# Patient Record
Sex: MALE | Race: WHITE | NOT HISPANIC OR LATINO | Employment: OTHER | URBAN - METROPOLITAN AREA
[De-identification: names, ages, dates, MRNs, and addresses within clinical notes are randomized per-mention and may not be internally consistent; named-entity substitution may affect disease eponyms.]

---

## 2017-02-14 ENCOUNTER — TELEPHONE (OUTPATIENT)
Dept: ORTHOPEDIC SURGERY | Facility: CLINIC | Age: 37
End: 2017-02-14

## 2017-02-14 NOTE — TELEPHONE ENCOUNTER
Not sure if you want to refill this as it looks like it was while patient was doing therapy. Please advise thank you-lck

## 2017-03-20 ENCOUNTER — TELEPHONE (OUTPATIENT)
Dept: ORTHOPEDIC SURGERY | Facility: CLINIC | Age: 37
End: 2017-03-20

## 2018-01-19 ENCOUNTER — TELEPHONE (OUTPATIENT)
Dept: ORTHOPEDIC SURGERY | Facility: CLINIC | Age: 38
End: 2018-01-19

## 2018-07-27 ENCOUNTER — APPOINTMENT (OUTPATIENT)
Dept: CT IMAGING | Facility: HOSPITAL | Age: 38
End: 2018-07-27

## 2018-07-27 ENCOUNTER — APPOINTMENT (OUTPATIENT)
Dept: GENERAL RADIOLOGY | Facility: HOSPITAL | Age: 38
End: 2018-07-27

## 2018-07-27 ENCOUNTER — HOSPITAL ENCOUNTER (EMERGENCY)
Facility: HOSPITAL | Age: 38
Discharge: HOME | End: 2018-07-27
Attending: EMERGENCY MEDICINE | Admitting: EMERGENCY MEDICINE

## 2018-07-27 VITALS
HEIGHT: 70 IN | TEMPERATURE: 98.7 F | HEART RATE: 88 BPM | OXYGEN SATURATION: 96 % | RESPIRATION RATE: 16 BRPM | DIASTOLIC BLOOD PRESSURE: 88 MMHG | SYSTOLIC BLOOD PRESSURE: 146 MMHG

## 2018-07-27 DIAGNOSIS — S09.93XA DENTAL INJURY, INITIAL ENCOUNTER: Primary | ICD-10-CM
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
## 2018-07-27 DIAGNOSIS — S01.511A LACERATION OF LOWER LIP, INITIAL ENCOUNTER: ICD-10-CM
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
## 2018-07-27 DIAGNOSIS — M25.512 LEFT SHOULDER PAIN, UNSPECIFIED CHRONICITY: ICD-10-CM

## 2018-07-27 PROCEDURE — 73030 X-RAY EXAM OF SHOULDER: CPT

## 2018-07-27 PROCEDURE — 70450 CT HEAD/BRAIN W/O DYE: CPT

## 2018-07-27 PROCEDURE — 25010000002 TDAP 5-2.5-18.5 LF-MCG/0.5 SUSPENSION: Performed by: PHYSICIAN ASSISTANT

## 2018-07-27 PROCEDURE — 99284 EMERGENCY DEPT VISIT MOD MDM: CPT

## 2018-07-27 PROCEDURE — 90471 IMMUNIZATION ADMIN: CPT | Performed by: PHYSICIAN ASSISTANT

## 2018-07-27 PROCEDURE — 70486 CT MAXILLOFACIAL W/O DYE: CPT

## 2018-07-27 PROCEDURE — 90715 TDAP VACCINE 7 YRS/> IM: CPT | Performed by: PHYSICIAN ASSISTANT

## 2018-07-27 RX ORDER — HYDROCODONE BITARTRATE AND ACETAMINOPHEN 5; 325 MG/1; MG/1
1 TABLET ORAL EVERY 12 HOURS
COMMUNITY

## 2018-07-27 RX ORDER — AMOXICILLIN 500 MG/1
1000 CAPSULE ORAL 3 TIMES DAILY
Qty: 21 CAPSULE | Refills: 0 | Status: SHIPPED | OUTPATIENT
Start: 2018-07-27 | End: 2019-09-02 | Stop reason: HOSPADM

## 2018-07-27 RX ORDER — DICLOFENAC SODIUM 75 MG/1
75 TABLET, DELAYED RELEASE ORAL 2 TIMES DAILY
Qty: 20 TABLET | Refills: 0 | Status: SHIPPED | OUTPATIENT
Start: 2018-07-27 | End: 2019-09-02 | Stop reason: HOSPADM

## 2018-07-27 RX ORDER — LIDOCAINE HYDROCHLORIDE AND EPINEPHRINE 10; 10 MG/ML; UG/ML
20 INJECTION, SOLUTION INFILTRATION; PERINEURAL ONCE
Status: COMPLETED | OUTPATIENT
Start: 2018-07-27 | End: 2018-07-27

## 2018-07-27 RX ORDER — OXYCODONE HYDROCHLORIDE AND ACETAMINOPHEN 5; 325 MG/1; MG/1
1 TABLET ORAL ONCE
Status: COMPLETED | OUTPATIENT
Start: 2018-07-27 | End: 2018-07-27

## 2018-07-27 RX ADMIN — LIDOCAINE HYDROCHLORIDE,EPINEPHRINE BITARTRATE 1 ML: 10; .01 INJECTION, SOLUTION INFILTRATION; PERINEURAL at 23:04

## 2018-07-27 RX ADMIN — OXYCODONE HYDROCHLORIDE AND ACETAMINOPHEN 1 TABLET: 5; 325 TABLET ORAL at 23:32

## 2018-07-27 RX ADMIN — TETANUS TOXOID, REDUCED DIPHTHERIA TOXOID AND ACELLULAR PERTUSSIS VACCINE, ADSORBED 0.5 ML: 5; 2.5; 8; 8; 2.5 SUSPENSION INTRAMUSCULAR at 21:55

## 2019-08-31 ENCOUNTER — APPOINTMENT (OUTPATIENT)
Dept: CT IMAGING | Facility: HOSPITAL | Age: 39
End: 2019-08-31

## 2019-08-31 ENCOUNTER — HOSPITAL ENCOUNTER (OUTPATIENT)
Facility: HOSPITAL | Age: 39
Setting detail: OBSERVATION
Discharge: HOME | End: 2019-09-02
Attending: EMERGENCY MEDICINE | Admitting: INTERNAL MEDICINE

## 2019-08-31 DIAGNOSIS — R56.9 NEW ONSET SEIZURE (HCC): Primary | ICD-10-CM

## 2019-08-31 PROCEDURE — 99285 EMERGENCY DEPT VISIT HI MDM: CPT

## 2019-08-31 PROCEDURE — 96374 THER/PROPH/DIAG INJ IV PUSH: CPT

## 2019-08-31 PROCEDURE — 70450 CT HEAD/BRAIN W/O DYE: CPT

## 2019-08-31 PROCEDURE — G0378 HOSPITAL OBSERVATION PER HR: HCPCS

## 2019-08-31 PROCEDURE — 36415 COLL VENOUS BLD VENIPUNCTURE: CPT

## 2019-08-31 RX ORDER — SODIUM CHLORIDE 0.9 % (FLUSH) 0.9 %
10 SYRINGE (ML) INJECTION AS NEEDED
Status: DISCONTINUED | OUTPATIENT
Start: 2019-08-31 | End: 2019-09-02 | Stop reason: HOSPADM

## 2019-09-01 PROBLEM — F98.8 ADD (ATTENTION DEFICIT DISORDER): Status: ACTIVE | Noted: 2019-09-01

## 2019-09-01 PROBLEM — R56.9 NEW ONSET SEIZURE (HCC): Status: ACTIVE | Noted: 2019-09-01

## 2019-09-01 PROBLEM — F12.10 MILD TETRAHYDROCANNABINOL (THC) ABUSE: Status: ACTIVE | Noted: 2019-09-01

## 2019-09-01 LAB
ALBUMIN SERPL-MCNC: 4.3 G/DL (ref 3.5–5.2)
ALBUMIN/GLOB SERPL: 1.7 G/DL
ALP SERPL-CCNC: 57 U/L (ref 39–117)
ALT SERPL W P-5'-P-CCNC: 37 U/L (ref 1–41)
AMPHET+METHAMPHET UR QL: NEGATIVE
ANION GAP SERPL CALCULATED.3IONS-SCNC: 10.2 MMOL/L (ref 5–15)
ANION GAP SERPL CALCULATED.3IONS-SCNC: 8.1 MMOL/L (ref 5–15)
AST SERPL-CCNC: 28 U/L (ref 1–40)
BARBITURATES UR QL SCN: NEGATIVE
BASOPHILS # BLD AUTO: 0.06 10*3/MM3 (ref 0–0.2)
BASOPHILS NFR BLD AUTO: 0.6 % (ref 0–1.5)
BENZODIAZ UR QL SCN: NEGATIVE
BILIRUB SERPL-MCNC: 0.2 MG/DL (ref 0.2–1.2)
BILIRUB UR QL STRIP: NEGATIVE
BUN BLD-MCNC: 11 MG/DL (ref 6–20)
BUN BLD-MCNC: 9 MG/DL (ref 6–20)
BUN/CREAT SERPL: 11 (ref 7–25)
BUN/CREAT SERPL: 11.3 (ref 7–25)
CALCIUM SPEC-SCNC: 8.4 MG/DL (ref 8.6–10.5)
CALCIUM SPEC-SCNC: 8.7 MG/DL (ref 8.6–10.5)
CANNABINOIDS SERPL QL: POSITIVE
CHLORIDE SERPL-SCNC: 100 MMOL/L (ref 98–107)
CHLORIDE SERPL-SCNC: 99 MMOL/L (ref 98–107)
CK SERPL-CCNC: 137 U/L (ref 20–200)
CLARITY UR: ABNORMAL
CO2 SERPL-SCNC: 24.8 MMOL/L (ref 22–29)
CO2 SERPL-SCNC: 24.9 MMOL/L (ref 22–29)
COCAINE UR QL: NEGATIVE
COLOR UR: YELLOW
CREAT BLD-MCNC: 0.82 MG/DL (ref 0.76–1.27)
CREAT BLD-MCNC: 0.97 MG/DL (ref 0.76–1.27)
DEPRECATED RDW RBC AUTO: 43.8 FL (ref 37–54)
DEPRECATED RDW RBC AUTO: 45.2 FL (ref 37–54)
EOSINOPHIL # BLD AUTO: 0.16 10*3/MM3 (ref 0–0.4)
EOSINOPHIL NFR BLD AUTO: 1.5 % (ref 0.3–6.2)
ERYTHROCYTE [DISTWIDTH] IN BLOOD BY AUTOMATED COUNT: 12.8 % (ref 12.3–15.4)
ERYTHROCYTE [DISTWIDTH] IN BLOOD BY AUTOMATED COUNT: 13.1 % (ref 12.3–15.4)
ETHANOL BLD-MCNC: <10 MG/DL (ref 0–10)
ETHANOL UR QL: <0.01 %
GFR SERPL CREATININE-BSD FRML MDRD: 105 ML/MIN/1.73
GFR SERPL CREATININE-BSD FRML MDRD: 86 ML/MIN/1.73
GLOBULIN UR ELPH-MCNC: 2.6 GM/DL
GLUCOSE BLD-MCNC: 150 MG/DL (ref 65–99)
GLUCOSE BLD-MCNC: 178 MG/DL (ref 65–99)
GLUCOSE BLDC GLUCOMTR-MCNC: 152 MG/DL (ref 70–130)
GLUCOSE BLDC GLUCOMTR-MCNC: 167 MG/DL (ref 70–130)
GLUCOSE UR STRIP-MCNC: ABNORMAL MG/DL
HCT VFR BLD AUTO: 40.3 % (ref 37.5–51)
HCT VFR BLD AUTO: 45.2 % (ref 37.5–51)
HGB BLD-MCNC: 12.8 G/DL (ref 13–17.7)
HGB BLD-MCNC: 14.4 G/DL (ref 13–17.7)
HGB UR QL STRIP.AUTO: NEGATIVE
IMM GRANULOCYTES # BLD AUTO: 0.17 10*3/MM3 (ref 0–0.05)
IMM GRANULOCYTES NFR BLD AUTO: 1.6 % (ref 0–0.5)
KETONES UR QL STRIP: ABNORMAL
LEUKOCYTE ESTERASE UR QL STRIP.AUTO: NEGATIVE
LYMPHOCYTES # BLD AUTO: 2.37 10*3/MM3 (ref 0.7–3.1)
LYMPHOCYTES NFR BLD AUTO: 21.9 % (ref 19.6–45.3)
MAGNESIUM SERPL-MCNC: 2.1 MG/DL (ref 1.6–2.6)
MCH RBC QN AUTO: 29.6 PG (ref 26.6–33)
MCH RBC QN AUTO: 30.2 PG (ref 26.6–33)
MCHC RBC AUTO-ENTMCNC: 31.8 G/DL (ref 31.5–35.7)
MCHC RBC AUTO-ENTMCNC: 31.9 G/DL (ref 31.5–35.7)
MCV RBC AUTO: 93 FL (ref 79–97)
MCV RBC AUTO: 95 FL (ref 79–97)
METHADONE UR QL SCN: POSITIVE
MONOCYTES # BLD AUTO: 0.74 10*3/MM3 (ref 0.1–0.9)
MONOCYTES NFR BLD AUTO: 6.8 % (ref 5–12)
NEUTROPHILS # BLD AUTO: 7.32 10*3/MM3 (ref 1.7–7)
NEUTROPHILS NFR BLD AUTO: 67.6 % (ref 42.7–76)
NITRITE UR QL STRIP: NEGATIVE
NRBC BLD AUTO-RTO: 0.1 /100 WBC (ref 0–0.2)
OPIATES UR QL: NEGATIVE
OXYCODONE UR QL SCN: NEGATIVE
PH UR STRIP.AUTO: <=5 [PH] (ref 5–8)
PLATELET # BLD AUTO: 224 10*3/MM3 (ref 140–450)
PLATELET # BLD AUTO: 245 10*3/MM3 (ref 140–450)
PMV BLD AUTO: 11.1 FL (ref 6–12)
PMV BLD AUTO: 11.5 FL (ref 6–12)
POTASSIUM BLD-SCNC: 3.5 MMOL/L (ref 3.5–5.2)
POTASSIUM BLD-SCNC: 4.1 MMOL/L (ref 3.5–5.2)
PROT SERPL-MCNC: 6.9 G/DL (ref 6–8.5)
PROT UR QL STRIP: NEGATIVE
RBC # BLD AUTO: 4.24 10*6/MM3 (ref 4.14–5.8)
RBC # BLD AUTO: 4.86 10*6/MM3 (ref 4.14–5.8)
SODIUM BLD-SCNC: 133 MMOL/L (ref 136–145)
SODIUM BLD-SCNC: 134 MMOL/L (ref 136–145)
SP GR UR STRIP: 1.02 (ref 1–1.03)
UROBILINOGEN UR QL STRIP: ABNORMAL
WBC NRBC COR # BLD: 10.59 10*3/MM3 (ref 3.4–10.8)
WBC NRBC COR # BLD: 10.82 10*3/MM3 (ref 3.4–10.8)

## 2019-09-01 PROCEDURE — 25010000002 LORAZEPAM PER 2 MG: Performed by: EMERGENCY MEDICINE

## 2019-09-01 PROCEDURE — 80053 COMPREHEN METABOLIC PANEL: CPT | Performed by: PHYSICIAN ASSISTANT

## 2019-09-01 PROCEDURE — 80307 DRUG TEST PRSMV CHEM ANLYZR: CPT | Performed by: PHYSICIAN ASSISTANT

## 2019-09-01 PROCEDURE — 99244 OFF/OP CNSLTJ NEW/EST MOD 40: CPT | Performed by: PSYCHIATRY & NEUROLOGY

## 2019-09-01 PROCEDURE — 83735 ASSAY OF MAGNESIUM: CPT | Performed by: PHYSICIAN ASSISTANT

## 2019-09-01 PROCEDURE — 82550 ASSAY OF CK (CPK): CPT | Performed by: NURSE PRACTITIONER

## 2019-09-01 PROCEDURE — 85025 COMPLETE CBC W/AUTO DIFF WBC: CPT | Performed by: PHYSICIAN ASSISTANT

## 2019-09-01 PROCEDURE — 96374 THER/PROPH/DIAG INJ IV PUSH: CPT

## 2019-09-01 PROCEDURE — 85027 COMPLETE CBC AUTOMATED: CPT | Performed by: NURSE PRACTITIONER

## 2019-09-01 PROCEDURE — G0378 HOSPITAL OBSERVATION PER HR: HCPCS

## 2019-09-01 PROCEDURE — 81003 URINALYSIS AUTO W/O SCOPE: CPT | Performed by: PHYSICIAN ASSISTANT

## 2019-09-01 PROCEDURE — 82962 GLUCOSE BLOOD TEST: CPT

## 2019-09-01 PROCEDURE — 96361 HYDRATE IV INFUSION ADD-ON: CPT

## 2019-09-01 RX ORDER — ONDANSETRON 2 MG/ML
4 INJECTION INTRAMUSCULAR; INTRAVENOUS EVERY 6 HOURS PRN
Status: DISCONTINUED | OUTPATIENT
Start: 2019-09-01 | End: 2019-09-02 | Stop reason: HOSPADM

## 2019-09-01 RX ORDER — TRAMADOL HYDROCHLORIDE 50 MG/1
50 TABLET ORAL EVERY 6 HOURS PRN
Status: DISCONTINUED | OUTPATIENT
Start: 2019-09-01 | End: 2019-09-02 | Stop reason: HOSPADM

## 2019-09-01 RX ORDER — SODIUM CHLORIDE 0.9 % (FLUSH) 0.9 %
10 SYRINGE (ML) INJECTION AS NEEDED
Status: DISCONTINUED | OUTPATIENT
Start: 2019-09-01 | End: 2019-09-02 | Stop reason: HOSPADM

## 2019-09-01 RX ORDER — TRAMADOL HYDROCHLORIDE 50 MG/1
50 TABLET ORAL EVERY 6 HOURS PRN
COMMUNITY
End: 2019-09-02 | Stop reason: HOSPADM

## 2019-09-01 RX ORDER — ACETAMINOPHEN 650 MG/1
650 SUPPOSITORY RECTAL EVERY 4 HOURS PRN
Status: DISCONTINUED | OUTPATIENT
Start: 2019-09-01 | End: 2019-09-02 | Stop reason: HOSPADM

## 2019-09-01 RX ORDER — DEXTROAMPHETAMINE SULFATE 30 MG/1
TABLET ORAL
COMMUNITY

## 2019-09-01 RX ORDER — SODIUM CHLORIDE 9 MG/ML
75 INJECTION, SOLUTION INTRAVENOUS CONTINUOUS
Status: DISCONTINUED | OUTPATIENT
Start: 2019-09-01 | End: 2019-09-02 | Stop reason: HOSPADM

## 2019-09-01 RX ORDER — ACETAMINOPHEN 325 MG/1
650 TABLET ORAL EVERY 4 HOURS PRN
Status: DISCONTINUED | OUTPATIENT
Start: 2019-09-01 | End: 2019-09-02 | Stop reason: HOSPADM

## 2019-09-01 RX ORDER — LORAZEPAM 2 MG/ML
1 INJECTION INTRAMUSCULAR ONCE
Status: COMPLETED | OUTPATIENT
Start: 2019-09-01 | End: 2019-09-01

## 2019-09-01 RX ORDER — SODIUM CHLORIDE 0.9 % (FLUSH) 0.9 %
10 SYRINGE (ML) INJECTION EVERY 12 HOURS SCHEDULED
Status: DISCONTINUED | OUTPATIENT
Start: 2019-09-01 | End: 2019-09-02 | Stop reason: HOSPADM

## 2019-09-01 RX ORDER — ACETAMINOPHEN 160 MG/5ML
650 SOLUTION ORAL EVERY 4 HOURS PRN
Status: DISCONTINUED | OUTPATIENT
Start: 2019-09-01 | End: 2019-09-02 | Stop reason: HOSPADM

## 2019-09-01 RX ADMIN — SODIUM CHLORIDE, PRESERVATIVE FREE 10 ML: 5 INJECTION INTRAVENOUS at 10:49

## 2019-09-01 RX ADMIN — SODIUM CHLORIDE 75 ML/HR: 9 INJECTION, SOLUTION INTRAVENOUS at 06:55

## 2019-09-01 RX ADMIN — ACETAMINOPHEN 650 MG: 325 TABLET, FILM COATED ORAL at 14:23

## 2019-09-01 RX ADMIN — SODIUM CHLORIDE, PRESERVATIVE FREE 10 ML: 5 INJECTION INTRAVENOUS at 21:06

## 2019-09-01 RX ADMIN — LORAZEPAM 1 MG: 2 INJECTION INTRAMUSCULAR; INTRAVENOUS at 00:28

## 2019-09-01 RX ADMIN — SODIUM CHLORIDE 1000 ML: 9 INJECTION, SOLUTION INTRAVENOUS at 04:28

## 2019-09-01 NOTE — ED NOTES
Pt unable to provide urine sample at this time. Urinal left at bedside.     Romero Watkins  09/01/19 0033

## 2019-09-01 NOTE — H&P
[facility] Admission H&P    Patient Care Team:  Provider, No Known as PCP - General    Chief complaint: Seizure-like activity at home    History of Present Illness    This is a 38-year-old male who presented to the emergency room after he was observed to have seizure-like activity at home.  He was sitting on the couch with his significant other playing a card game at approximately 11:00 last night when he seemingly lost consciousness, had rhythmic jerking motions, and was otherwise unresponsive.  He does not room for any of the events.  When he woke up from the event he was confused and felt that his mentation was cloudy.  He denies any bladder or bowel incontinence.  He did not think that he had any tongue biting but evaluation in the emergency room did reveal bruising to the right side of his tongue and he does have some soreness.  He has never had a seizure before nor does have any family history of neurologic disorders.  He takes tramadol on a fairly regular basis for mouth and tooth pain status post multiple extractions.  He did take 100 mg of this at approximately 930 last night.  He does also admit to THC use but states it is been approximately 3 days since he last smoked marijuana.  He is also prescribed amphetamines for ADD along with gabapentin and hydrocodone but states he is not taking any of these medications in at least 3 weeks.  He denies any other illicit drug use.  When I asked him about the positive methadone screen on his UDS he does not know where this came from.  The only over-the-counter medication he has taken recently was Robitussin for a cough.  He has not had any recurrence of seizure-like activity since his presentation and subsequent admission.  He feels back to baseline currently.    Past Medical History:   Diagnosis Date   • Acute meniscal tear of left knee    • ADD (attention deficit disorder)    • Anxiety as acute reaction to exceptional stress    • Depression    • Difficulty sleeping    •  Joint pain     or swelling   • Stiffness in joint    • Unexplained weight gain     or loss     Past Surgical History:   Procedure Laterality Date   • KNEE ARTHROSCOPY Left 2016    Procedure: LEFT KNEE ARTHROSCOPY WITH PARTIAL LATERAL MENISECTOMY AND DEBRIDEMENT;  Surgeon: Bertin Fair MD;  [facility] [place] [facility]  Service:    • OTHER SURGICAL HISTORY      knee x-ray   • SHOULDER ARTHROSCOPY W/ LABRAL REPAIR Left 2017   • WISDOM TOOTH EXTRACTION  ,      History reviewed. No pertinent family history.  Social History     Tobacco Use   • Smoking status: Former Smoker     Packs/day: 1.00     Years: 14.00     Pack years: 14.00     Last attempt to quit: 2014     Years since quittin.3   Substance Use Topics   • Alcohol use: No   • Drug use: Yes     Types: Marijuana     Comment: used yesterday     Medications Prior to Admission   Medication Sig Dispense Refill Last Dose   • acetaminophen (TYLENOL) 500 MG tablet Take 500 mg by mouth Every 6 (Six) Hours As Needed for mild pain (1-3).   Taking   • Dextroamphetamine Sulfate (ZENZEDI) 30 MG tablet Zenzedi 30 mg tablet      • HYDROcodone-acetaminophen (NORCO) 5-325 MG per tablet Take 1 tablet by mouth Every 12 (Twelve) Hours.      • traMADol (ULTRAM) 50 MG tablet Take 50 mg by mouth Every 6 (Six) Hours As Needed for Moderate Pain .      • amoxicillin (AMOXIL) 500 MG capsule Take 2 capsules by mouth 3 (Three) Times a Day. 21 capsule 0    • diclofenac (VOLTAREN) 75 MG EC tablet Take 1 tablet by mouth 2 (Two) Times a Day. 20 tablet 0      Allergies:  Patient has no known allergies.    Review of Systems   Constitutional: Negative for chills and fever.   HENT: Negative for congestion and sore throat.    Eyes: Negative for visual disturbance.   Respiratory: Negative for cough, chest tightness, shortness of breath and wheezing.    Cardiovascular: Negative for chest pain, palpitations and leg swelling.   Gastrointestinal: Negative for abdominal distention,  abdominal pain, diarrhea, nausea and vomiting.   Endocrine: Negative for polydipsia and polyuria.   Genitourinary: Negative for difficulty urinating, dysuria, frequency and urgency.   Musculoskeletal: Negative for arthralgias and myalgias.   Skin: Negative for color change and rash.   Neurological: Positive for seizures. Negative for dizziness and light-headedness.        PHYSICAL EXAM    Vital Signs  tMax 24 hrs:  Temp (24hrs), Av.6 °F (36.4 °C), Min:97.2 °F (36.2 °C), Max:97.9 °F (36.6 °C)    Vitals Ranges:  Temp:  [97.2 °F (36.2 °C)-97.9 °F (36.6 °C)] 97.9 °F (36.6 °C)  Heart Rate:  [] 81  Resp:  [16-18] 18  BP: (105-162)/(66-93) 129/85    Physical Exam   Constitutional: He is oriented to person, place, and time. He appears well-developed and well-nourished.   HENT:   Head: Normocephalic and atraumatic.   Small area of bruising to the right lateral tongue   Eyes: EOM are normal. Pupils are equal, round, and reactive to light.   Neck: Neck supple. No tracheal deviation present.   Cardiovascular: Normal rate and regular rhythm. Exam reveals no gallop.   No murmur heard.  Pulmonary/Chest: Effort normal. No respiratory distress. He has no wheezes.   Abdominal: Soft. Bowel sounds are normal. He exhibits no distension. There is no tenderness.   Musculoskeletal: He exhibits no edema or tenderness.   Neurological: He is alert and oriented to person, place, and time. No cranial nerve deficit.   Skin: Skin is warm and dry.   Nursing note and vitals reviewed.      Results Review:  Results from last 7 days   Lab Units 19  0624   WBC 10*3/mm3 10.59   HEMOGLOBIN g/dL 12.8*   HEMATOCRIT % 40.3   PLATELETS 10*3/mm3 224     Results from last 7 days   Lab Units 19  0624 19  0023   SODIUM mmol/L 133* 134*   POTASSIUM mmol/L 4.1 3.5   CHLORIDE mmol/L 100 99   CO2 mmol/L 24.9 24.8   BUN mg/dL 9 11   CREATININE mg/dL 0.82 0.97   CALCIUM mg/dL 8.4* 8.7   BILIRUBIN mg/dL  --  0.2   ALK PHOS U/L  --  57   ALT  (SGPT) U/L  --  37   AST (SGOT) U/L  --  28   GLUCOSE mg/dL 150* 178*     Head CT showed no acute intracranial abnormality     I reviewed the patient's new clinical results.  I reviewed the patient's new imaging results and agree with the interpretation.        Active Hospital Problems    Diagnosis  POA   • **New onset seizure (CMS/HCC) [R56.9]  Yes   • Mild tetrahydrocannabinol (THC) abuse [F12.10]  Unknown   • ADD (attention deficit disorder) [F98.8]  Unknown      Resolved Hospital Problems   No resolved problems to display.       Assessment & Plan    The patient was admitted overnight last night for what sounds to be a new onset seizure.  He has no prior history of this.  He did take tramadol yesterday about an hour and a half before the episode and does smoke marijuana with some regularity.  He denies any other prescription or illicit drug use.  He has had no recurrence of seizures and head CT was negative.  Neurology has been consulted.  At this point we will hold off on scheduled antiepileptic medications.  We will see if they want to perform a brain MRI and EEG while he is here.  I have stopped all potentially contributing medications.  I do not get the sense that he is withdrawing from anything.  Aside from feeling a little fatigued he is now back at baseline.  As based on his clinical course and input from neurology.    I discussed the patients findings and my recommendations with patient and family    Rosendo Kumar MD  09/01/19  11:51 AM

## 2019-09-01 NOTE — ED NOTES
Pt still unable to provide urine sample. Pt given more water to drink. Pt instructed to call when he is able to give a sample.     Mendy De Gzuman RN  09/01/19 0329

## 2019-09-01 NOTE — ED NOTES
EMS picked pt up from home. Pt had a witnessed seizure while sitting on the cough with his girlfriend. Upon arrival EMS reports that pt was post ictal.  Pt is now alert and oriented. EMS reports upon pulling into ER pt became diaphoretic and heart rate elevated. Pt reports get sweats like this when he is nervous. Initially pt denies any complaints. Pt reports that he is not reporting a headache. EMS reports noting some abd distention and pt is unaware if that is normal. EMS reports that pt has HTN but is not compliant with medication.     Ursula Baker RN  08/31/19 [id number]

## 2019-09-01 NOTE — CONSULTS
Patient Identification:  NAME:  Juve Crane  Age:  38 y.o.   Sex:  male   :  1980   MRN:  [id number]       Chief complaint: He does not have one/ reason for consult seizure  History of present illness: This patient is 38-year-old right-handed white male with a history of ADD anxiety depression history of drug abuse who comes to the hospital after he was visiting with friends or sitting on the couch with his girlfriend he had taken 100 mg of Ultram as context before that.  Quality was a loss of consciousness and tonic-clonic seizure he did bite his tongue is not associated symptom modifying factors none quality as described duration is noted context of patient who smokes marijuana and tested positive for methadone although he does not know how he tested positive for that CT of the head by my independent eyeball review is normal.  By report he has hypertension but is not compliant with medication either.      Past medical history:  Past Medical History:   Diagnosis Date   • Acute meniscal tear of left knee    • ADD (attention deficit disorder)    • Anxiety as acute reaction to exceptional stress    • Depression    • Difficulty sleeping    • Joint pain     or swelling   • Stiffness in joint    • Unexplained weight gain     or loss       Past surgical history:  Past Surgical History:   Procedure Laterality Date   • KNEE ARTHROSCOPY Left 2016    Procedure: LEFT KNEE ARTHROSCOPY WITH PARTIAL LATERAL MENISECTOMY AND DEBRIDEMENT;  Surgeon: Bertin Fair MD;  [facility] [place] [facility];  Service:    • OTHER SURGICAL HISTORY      knee x-ray   • SHOULDER ARTHROSCOPY W/ LABRAL REPAIR Left 2017   • WISDOM TOOTH EXTRACTION  2005       Allergies:  Patient has no known allergies.    Home medications:  Medications Prior to Admission   Medication Sig Dispense Refill Last Dose   • acetaminophen (TYLENOL) 500 MG tablet Take 500 mg by mouth Every 6 (Six) Hours As Needed for mild pain (1-3).   Taking   •  Dextroamphetamine Sulfate (ZENZEDI) 30 MG tablet Zenzedi 30 mg tablet      • HYDROcodone-acetaminophen (NORCO) 5-325 MG per tablet Take 1 tablet by mouth Every 12 (Twelve) Hours.      • traMADol (ULTRAM) 50 MG tablet Take 50 mg by mouth Every 6 (Six) Hours As Needed for Moderate Pain .      • amoxicillin (AMOXIL) 500 MG capsule Take 2 capsules by mouth 3 (Three) Times a Day. 21 capsule 0    • diclofenac (VOLTAREN) 75 MG EC tablet Take 1 tablet by mouth 2 (Two) Times a Day. 20 tablet 0         Hospital medications:    sodium chloride 10 mL Intravenous Q12H       sodium chloride 75 mL/hr Last Rate: 75 mL/hr (19 0943)     •  acetaminophen **OR** acetaminophen **OR** acetaminophen  •  ondansetron  •  [COMPLETED] Insert peripheral IV **AND** sodium chloride  •  sodium chloride    Family history:  History reviewed. No pertinent family history.    Social history:  Social History     Tobacco Use   • Smoking status: Former Smoker     Packs/day: 1.00     Years: 14.00     Pack years: 14.00     Last attempt to quit: 2014     Years since quittin.3   Substance Use Topics   • Alcohol use: No   • Drug use: Yes     Types: Marijuana     Comment: used yesterday       Review of systems:    He feels tired he did bite his tongue he is complained of being nervous but no hair eyes ears nose throat skin bone joint  lymphatic hematologic or oncologic complaints no neck pain chest pain abdominal pain bowel bladder incontinence fever chills or rash he does not know how he tested positive for methadone    Objective:  Vitals Ranges:   Temp:  [97.2 °F (36.2 °C)-97.9 °F (36.6 °C)] 97.9 °F (36.6 °C)  Heart Rate:  [] 81  Resp:  [16-18] 18  BP: (105-162)/(66-93) 124/79      Physical Exam:  Awake alert oriented x3 in no distress fund of knowledge attention span concentration recent remote memory language is normal well-developed well-nourished in no distress obese out of shape bearded pupils one half constricting to 1 normal disc  retinas visual fields extraocular movements full without nystagmus nasolabial folds palate tongue symmetrical normal hearing facial sensation head turning shoulder shrug motor 5- out of 5 x4 no atrophy fasciculations rigidity bradykinesia resting tremor reflexes trace throughout symmetrical toes downgoing bilaterally sensation normal light touch face arm or legs coordination normal in the upper extremities he is able to ambulate without ataxia heart regular without murmur neck supple and without bruits extremities no clubbing cyanosis edema visual acuity normal at 3 feet    Results review:   I reviewed the patient's new clinical results.    Data review:  Lab Results (last 24 hours)     Procedure Component Value Units Date/Time    Basic Metabolic Panel [[id number]]  (Abnormal) Collected:  09/01/19 0624    Specimen:  Blood Updated:  09/01/19 0720     Glucose 150 mg/dL      BUN 9 mg/dL      Creatinine 0.82 mg/dL      Sodium 133 mmol/L      Potassium 4.1 mmol/L      Chloride 100 mmol/L      CO2 24.9 mmol/L      Calcium 8.4 mg/dL      eGFR Non African Amer 105 mL/min/1.73      BUN/Creatinine Ratio 11.0     Anion Gap 8.1 mmol/L     Narrative:       GFR Normal >60  Chronic Kidney Disease <60  Kidney Failure <15    CK [[id number]]  (Normal) Collected:  09/01/19 0624    Specimen:  Blood Updated:  09/01/19 0720     Creatine Kinase 137 U/L     CBC (No Diff) [[id number]]  (Abnormal) Collected:  09/01/19 0624    Specimen:  Blood Updated:  09/01/19 0653     WBC 10.59 10*3/mm3      RBC 4.24 10*6/mm3      Hemoglobin 12.8 g/dL      Hematocrit 40.3 %      MCV 95.0 fL      MCH 30.2 pg      MCHC 31.8 g/dL      RDW 13.1 %      RDW-SD 45.2 fl      MPV 11.5 fL      Platelets 224 10*3/mm3     POC Glucose Once [[id number]]  (Abnormal) Collected:  09/01/19 0612    Specimen:  Blood Updated:  09/01/19 0617     Glucose 152 mg/dL     Urine Drug Screen - Urine, Catheter [[id number]]  (Abnormal) Collected:  09/01/19 0427    Specimen:  Urine, Catheter  Updated:  09/01/19 0502     Amphet/Methamphet, Screen Negative     Barbiturates Screen, Urine Negative     Benzodiazepine Screen, Urine Negative     Cocaine Screen, Urine Negative     Opiate Screen Negative     THC, Screen, Urine Positive     Methadone Screen, Urine Positive     Oxycodone Screen, Urine Negative    Narrative:       Negative Thresholds For Drugs Screened:     Amphetamines               500 ng/ml   Barbiturates               200 ng/ml   Benzodiazepines            100 ng/ml   Cocaine                    300 ng/ml   Methadone                  300 ng/ml   Opiates                    300 ng/ml   Oxycodone                  100 ng/ml   THC                        50 ng/ml    The Normal Value for all drugs tested is negative. This report includes final unconfirmed screening results to be used for medical treatment purposes only. Unconfirmed results must not be used for non-medical purposes such as employment or legal testing. Clinical consideration should be applied to any drug of abuse test, particulary when unconfirmed results are used.    Urinalysis With Microscopic If Indicated (No Culture) - Urine, Catheter [[id number]]  (Abnormal) Collected:  09/01/19 0427    Specimen:  Urine, Catheter Updated:  09/01/19 0440     Color, UA Yellow     Appearance, UA Cloudy     pH, UA <=5.0     Specific Gravity, UA 1.023     Glucose,  mg/dL (2+)     Ketones, UA Trace     Bilirubin, UA Negative     Blood, UA Negative     Protein, UA Negative     Leuk Esterase, UA Negative     Nitrite, UA Negative     Urobilinogen, UA 0.2 E.U./dL    Narrative:       Urine microscopic not indicated.    Comprehensive Metabolic Panel [[id number]]  (Abnormal) Collected:  09/01/19 0023    Specimen:  Blood Updated:  09/01/19 0053     Glucose 178 mg/dL      BUN 11 mg/dL      Creatinine 0.97 mg/dL      Sodium 134 mmol/L      Potassium 3.5 mmol/L      Chloride 99 mmol/L      CO2 24.8 mmol/L      Calcium 8.7 mg/dL      Total Protein 6.9 g/dL       Albumin 4.30 g/dL      ALT (SGPT) 37 U/L      AST (SGOT) 28 U/L      Alkaline Phosphatase 57 U/L      Total Bilirubin 0.2 mg/dL      eGFR Non African Amer 86 mL/min/1.73      Globulin 2.6 gm/dL      A/G Ratio 1.7 g/dL      BUN/Creatinine Ratio 11.3     Anion Gap 10.2 mmol/L     Narrative:       GFR Normal >60  Chronic Kidney Disease <60  Kidney Failure <15    Ethanol [[id number]] Collected:  09/01/19 0023    Specimen:  Blood Updated:  09/01/19 0053     Ethanol <10 mg/dL      Ethanol % <0.010 %     Magnesium [[id number]]  (Normal) Collected:  09/01/19 0023    Specimen:  Blood Updated:  09/01/19 0053     Magnesium 2.1 mg/dL     CBC & Differential [[id number]] Collected:  09/01/19 0023    Specimen:  Blood Updated:  09/01/19 0040    Narrative:       The following orders were created for panel order CBC & Differential.  Procedure                               Abnormality         Status                     ---------                               -----------         ------                     CBC Auto Differential[[id number]]        Abnormal            Final result                 Please view results for these tests on the individual orders.    CBC Auto Differential [[id number]]  (Abnormal) Collected:  09/01/19 0023    Specimen:  Blood Updated:  09/01/19 0040     WBC 10.82 10*3/mm3      RBC 4.86 10*6/mm3      Hemoglobin 14.4 g/dL      Hematocrit 45.2 %      MCV 93.0 fL      MCH 29.6 pg      MCHC 31.9 g/dL      RDW 12.8 %      RDW-SD 43.8 fl      MPV 11.1 fL      Platelets 245 10*3/mm3      Neutrophil % 67.6 %      Lymphocyte % 21.9 %      Monocyte % 6.8 %      Eosinophil % 1.5 %      Basophil % 0.6 %      Immature Grans % 1.6 %      Neutrophils, Absolute 7.32 10*3/mm3      Lymphocytes, Absolute 2.37 10*3/mm3      Monocytes, Absolute 0.74 10*3/mm3      Eosinophils, Absolute 0.16 10*3/mm3      Basophils, Absolute 0.06 10*3/mm3      Immature Grans, Absolute 0.17 10*3/mm3      nRBC 0.1 /100 WBC            Imaging:  Imaging Results  (last 24 hours)     Procedure Component Value Units Date/Time    CT Head Without Contrast [[id number]] Collected:  08/31/19 2354     Updated:  08/31/19 2358    Narrative:       CRANIAL CT SCAN WITHOUT CONTRAST     CLINICAL HISTORY: New onset seizure     COMPARISON: 07/27/2018.     TECHNIQUE: Radiation dose reduction techniques were utilized, including  automated exposure control and exposure modulation based on body size.  Multiple axial images of the head were obtained without contrast.      FINDINGS:  There are no abnormal areas of increased density or mass  effect. Ventricles, sulci, and cisterns appear normal. Bone window  images are unremarkable.                Impression:       1. No acute intracranial abnormality.                 This report was finalized on 8/31/2019 11:55 PM by Leo Tavarez M.D.                Assessment and Plan:       New onset seizure (CMS/HCC)    Mild tetrahydrocannabinol (THC) abuse    ADD (attention deficit disorder)    This patient's seizure will be secondary to taking 100 mg of Ultram.  By report he is not a drinker of alcohol and does not take Xanax and I noticed he tested positive for methadone which should not have cause seizure activity.  I performed an independent I will review the CAT scan and it is normal.  At this point I would definitely not recommend further neurologic work-up this patient seized because he took 100 mg of Ultram which he will discontinue.  He takes multiple other illicit drugs and has a normal CAT scan I would recommend no further work-up and he will be discharged home.  This does not appear to be an alcohol or drug withdrawal syndrome but a direct result of the Tranxene which is known to cause seizure activity at high dose, which 100 mg is.      Sharan Garay MD  09/01/19  5:51 PM

## 2019-09-01 NOTE — ED PROVIDER NOTES
EMERGENCY DEPARTMENT ENCOUNTER    CHIEF COMPLAINT  Chief Complaint: seizure like activity  History given by: pt  History limited by: nothing  Room Number: N537/1  PMD: Provider, No Known      HPI:  Pt is a 38 y.o. male who presents complaining of sudden onset 5 minute episode of seizure like activity starting at 2300 while pt was rest playing cards w/ girlfriend on couch. Pt's girlfriend at bedside, who witnessed episode, reports pt having tensed arms outstretched and grabbing at air. She also reports pt having confusion and seeming post ictal after resolution of seizure. Also c/o mild bite to tongue and mild CP. Denies urinary/fecal incontinence, HA, or recent fall/trauma. Pt has no known hx or family hx of seizures. Pt's mother performed chest compressions on pt until EMS arrival. Denies using ETOH, but admits to occasional marijuana use.    PAST MEDICAL HISTORY  Active Ambulatory Problems     Diagnosis Date Noted   • S/P left knee arthroscopy 05/20/2016   • Right knee pain 09/23/2016   • Left knee pain 09/23/2016     Resolved Ambulatory Problems     Diagnosis Date Noted   • No Resolved Ambulatory Problems     Past Medical History:   Diagnosis Date   • Acute meniscal tear of left knee    • ADD (attention deficit disorder)    • Anxiety as acute reaction to exceptional stress    • Depression    • Difficulty sleeping    • Joint pain    • Stiffness in joint    • Unexplained weight gain        PAST SURGICAL HISTORY  Past Surgical History:   Procedure Laterality Date   • KNEE ARTHROSCOPY Left 5/13/2016    Procedure: LEFT KNEE ARTHROSCOPY WITH PARTIAL LATERAL MENISECTOMY AND DEBRIDEMENT;  Surgeon: Bertin Fair MD;  [facility]: [facility] OR [facility];  Service:    • OTHER SURGICAL HISTORY      knee x-ray   • SHOULDER ARTHROSCOPY W/ LABRAL REPAIR Left 2017   • WISDOM TOOTH EXTRACTION  2015, 2005       FAMILY HISTORY  History reviewed. No pertinent family history.    SOCIAL HISTORY  Social History     Socioeconomic History   •  Marital status: Single     Spouse name: Not on file   • Number of children: Not on file   • Years of education: Not on file   • Highest education level: Not on file   Tobacco Use   • Smoking status: Former Smoker     Packs/day: 1.00     Years: 14.00     Pack years: 14.00     Last attempt to quit: 2014     Years since quittin.3   Substance and Sexual Activity   • Alcohol use: No   • Drug use: Yes     Types: Marijuana     Comment: used yesterday       ALLERGIES  Patient has no known allergies.    REVIEW OF SYSTEMS  Review of Systems   Constitutional: Negative for activity change, appetite change and fever.   HENT: Negative for congestion and sore throat.    Eyes: Negative.    Respiratory: Negative for cough and shortness of breath.    Cardiovascular: Positive for chest pain (mild). Negative for leg swelling.   Gastrointestinal: Negative for abdominal pain, diarrhea and vomiting.   Endocrine: Negative.    Genitourinary: Negative for decreased urine volume and dysuria.   Musculoskeletal: Negative for neck pain.   Skin: Negative for rash and wound.   Allergic/Immunologic: Negative.    Neurological: Positive for seizures. Negative for weakness, numbness and headaches.   Hematological: Negative.    Psychiatric/Behavioral: Negative.    All other systems reviewed and are negative.      PHYSICAL EXAM  ED Triage Vitals   Temp Heart Rate Resp BP SpO2   19   97.2 °F (36.2 °C) 120 16 162/88 98 %      Temp src Heart Rate Source Patient Position BP Location FiO2 (%)   19 -- -- --   Tympanic Monitor          Physical Exam   Constitutional: No distress.   HENT:   Head: Normocephalic and atraumatic.   Small hematoma to R tongue   Eyes: EOM are normal.   Neck: Normal range of motion.   Cardiovascular: Tachycardia present.   No murmur heard.  Pulmonary/Chest: No respiratory distress.   Abdominal: There is no tenderness.   Musculoskeletal:  He exhibits no edema.   Neurological: He is alert.   Non focal neuro exam   Skin: Skin is warm. He is diaphoretic.   Nursing note and vitals reviewed.      LAB RESULTS  Lab Results (last 24 hours)     Procedure Component Value Units Date/Time    CBC & Differential [[id number]] Collected:  09/01/19 0023    Specimen:  Blood Updated:  09/01/19 0040    Narrative:       The following orders were created for panel order CBC & Differential.  Procedure                               Abnormality         Status                     ---------                               -----------         ------                     CBC Auto Differential[[id number]]        Abnormal            Final result                 Please view results for these tests on the individual orders.    Comprehensive Metabolic Panel [[id number]]  (Abnormal) Collected:  09/01/19 0023    Specimen:  Blood Updated:  09/01/19 0053     Glucose 178 mg/dL      BUN 11 mg/dL      Creatinine 0.97 mg/dL      Sodium 134 mmol/L      Potassium 3.5 mmol/L      Chloride 99 mmol/L      CO2 24.8 mmol/L      Calcium 8.7 mg/dL      Total Protein 6.9 g/dL      Albumin 4.30 g/dL      ALT (SGPT) 37 U/L      AST (SGOT) 28 U/L      Alkaline Phosphatase 57 U/L      Total Bilirubin 0.2 mg/dL      eGFR Non African Amer 86 mL/min/1.73      Globulin 2.6 gm/dL      A/G Ratio 1.7 g/dL      BUN/Creatinine Ratio 11.3     Anion Gap 10.2 mmol/L     Narrative:       GFR Normal >60  Chronic Kidney Disease <60  Kidney Failure <15    Ethanol [[id number]] Collected:  09/01/19 0023    Specimen:  Blood Updated:  09/01/19 0053     Ethanol <10 mg/dL      Ethanol % <0.010 %     Magnesium [[id number]]  (Normal) Collected:  09/01/19 0023    Specimen:  Blood Updated:  09/01/19 0053     Magnesium 2.1 mg/dL     CBC Auto Differential [[id number]]  (Abnormal) Collected:  09/01/19 0023    Specimen:  Blood Updated:  09/01/19 0040     WBC 10.82 10*3/mm3      RBC 4.86 10*6/mm3      Hemoglobin 14.4 g/dL      Hematocrit 45.2 %       MCV 93.0 fL      MCH 29.6 pg      MCHC 31.9 g/dL      RDW 12.8 %      RDW-SD 43.8 fl      MPV 11.1 fL      Platelets 245 10*3/mm3      Neutrophil % 67.6 %      Lymphocyte % 21.9 %      Monocyte % 6.8 %      Eosinophil % 1.5 %      Basophil % 0.6 %      Immature Grans % 1.6 %      Neutrophils, Absolute 7.32 10*3/mm3      Lymphocytes, Absolute 2.37 10*3/mm3      Monocytes, Absolute 0.74 10*3/mm3      Eosinophils, Absolute 0.16 10*3/mm3      Basophils, Absolute 0.06 10*3/mm3      Immature Grans, Absolute 0.17 10*3/mm3      nRBC 0.1 /100 WBC     Urine Drug Screen - Urine, Catheter [[id number]]  (Abnormal) Collected:  09/01/19 0427    Specimen:  Urine, Catheter Updated:  09/01/19 0502     Amphet/Methamphet, Screen Negative     Barbiturates Screen, Urine Negative     Benzodiazepine Screen, Urine Negative     Cocaine Screen, Urine Negative     Opiate Screen Negative     THC, Screen, Urine Positive     Methadone Screen, Urine Positive     Oxycodone Screen, Urine Negative    Narrative:       Negative Thresholds For Drugs Screened:     Amphetamines               500 ng/ml   Barbiturates               200 ng/ml   Benzodiazepines            100 ng/ml   Cocaine                    300 ng/ml   Methadone                  300 ng/ml   Opiates                    300 ng/ml   Oxycodone                  100 ng/ml   THC                        50 ng/ml    The Normal Value for all drugs tested is negative. This report includes final unconfirmed screening results to be used for medical treatment purposes only. Unconfirmed results must not be used for non-medical purposes such as employment or legal testing. Clinical consideration should be applied to any drug of abuse test, particulary when unconfirmed results are used.    Urinalysis With Microscopic If Indicated (No Culture) - Urine, Catheter [[id number]]  (Abnormal) Collected:  09/01/19 0427    Specimen:  Urine, Catheter Updated:  09/01/19 0440     Color, UA Yellow     Appearance, UA  Cloudy     pH, UA <=5.0     Specific Gravity, UA 1.023     Glucose,  mg/dL (2+)     Ketones, UA Trace     Bilirubin, UA Negative     Blood, UA Negative     Protein, UA Negative     Leuk Esterase, UA Negative     Nitrite, UA Negative     Urobilinogen, UA 0.2 E.U./dL    Narrative:       Urine microscopic not indicated.    POC Glucose Once [[id number]]  (Abnormal) Collected:  09/01/19 0612    Specimen:  Blood Updated:  09/01/19 0617     Glucose 152 mg/dL     Basic Metabolic Panel [[id number]] Collected:  09/01/19 0624    Specimen:  Blood Updated:  09/01/19 0647    CBC (No Diff) [[id number]]  (Abnormal) Collected:  09/01/19 0624    Specimen:  Blood Updated:  09/01/19 0653     WBC 10.59 10*3/mm3      RBC 4.24 10*6/mm3      Hemoglobin 12.8 g/dL      Hematocrit 40.3 %      MCV 95.0 fL      MCH 30.2 pg      MCHC 31.8 g/dL      RDW 13.1 %      RDW-SD 45.2 fl      MPV 11.5 fL      Platelets 224 10*3/mm3     CK [[id number]] Collected:  09/01/19 0624    Specimen:  Blood Updated:  09/01/19 0647          I ordered the above labs and reviewed the results.    RADIOLOGY  CT Head Without Contrast   Final Result   1. No acute intracranial abnormality.                       This report was finalized on 8/31/2019 11:55 PM by Leo Tavarez M.D.               I ordered the above noted radiological studies. Interpreted by radiologist. Reviewed by me in PACS.       PROCEDURES  Procedures        PROGRESS AND CONSULTS     0017- . 96% O2 sats on RA. BP 1030/78. Discussed w/ pt and family plan to check labs and imaging of head, as well as treat pt w/ Ativan. Pt understands and agrees with the plan, all questions answered.    0200- Discussed the pt w/ Dr. Evans. After performing their own physical exam of the pt, they agree w/ plan of care.     0338- Ordered IV Bolus.    0510- Placed call out to [facility].      0513- HR 91. 98% O2 sats on RA. /66. Rechecked pt. Pt is resting comfortably. Notified pt of negative CT Head and labs, except  for testing positive for Methadone and THC. Pt now reports taking Tramadol a few hours before seizure, prescribed by dentist for oral surgery. Discussed the plan to admit pt for further evaluation. Pt understands and agrees with the plan, all questions answered.    0530 Talked to Bhavani SANTOYO.  To place in observation under Dr Lozano care for further evaluation.    MEDICAL DECISION MAKING  Results were reviewed/discussed with the patient and they were also made aware of online access. Pt also made aware that some labs, such as cultures, will not be resulted during ER visit and follow up with PMD is necessary.     DIAGNOSIS  Final diagnoses:   New onset seizure (CMS/HCC)       DISPOSITION  ADMISSION    Discussed treatment plan and reason for admission with pt/family and admitting physician.  Pt/family voiced understanding of the plan for admission for further testing/treatment as needed.       Latest Documented Vital Signs:  As of 7:10 AM  BP- 133/90 HR- 81 Temp- 97.2 °F (36.2 °C) (Tympanic) O2 sat- 96%    --  Documentation assistance provided by yeison Vo for [place] PA-C.  Information recorded by the scribe was done at my direction and has been verified and validated by me.     Sharon Vo  09/01/19 0526       Klaus Hilton III, PA  09/01/19 1534

## 2019-09-01 NOTE — ED NOTES
Pt encouraged to provide urine sample. Pt sipping on water.     Mendy De Guzman RN  09/01/19 0236

## 2019-09-01 NOTE — PLAN OF CARE
Problem: Patient Care Overview  Goal: Plan of Care Review  Outcome: Ongoing (interventions implemented as appropriate)   09/01/19 4536   Coping/Psychosocial   Plan of Care Reviewed With patient   Plan of Care Review   Progress improving   OTHER   Outcome Summary Patient alert and oriented. Report of headache and tylenol given. Seen by neurology and suspect the seizure was due to the tranaxene. VSS. Will continue to monitor.       Problem: Seizure Disorder/Epilepsy (Adult)  Goal: Signs and Symptoms of Listed Potential Problems Will be Absent, Minimized or Managed (Seizure Disorder/Epilepsy)  Outcome: Ongoing (interventions implemented as appropriate)      Problem: Fall Risk (Adult)  Goal: Identify Related Risk Factors and Signs and Symptoms  Outcome: Outcome(s) achieved Date Met: 09/01/19    Goal: Absence of Fall  Outcome: Ongoing (interventions implemented as appropriate)

## 2019-09-02 VITALS
HEIGHT: 70 IN | SYSTOLIC BLOOD PRESSURE: 134 MMHG | DIASTOLIC BLOOD PRESSURE: 91 MMHG | WEIGHT: 262.5 LBS | HEART RATE: 94 BPM | OXYGEN SATURATION: 98 % | BODY MASS INDEX: 37.58 KG/M2 | TEMPERATURE: 98.4 F | RESPIRATION RATE: 18 BRPM

## 2019-09-02 LAB
GLUCOSE BLDC GLUCOMTR-MCNC: 139 MG/DL (ref 70–130)
GLUCOSE BLDC GLUCOMTR-MCNC: 170 MG/DL (ref 70–130)

## 2019-09-02 PROCEDURE — 96361 HYDRATE IV INFUSION ADD-ON: CPT

## 2019-09-02 PROCEDURE — 82962 GLUCOSE BLOOD TEST: CPT

## 2019-09-02 PROCEDURE — G0378 HOSPITAL OBSERVATION PER HR: HCPCS

## 2019-09-02 RX ADMIN — SODIUM CHLORIDE 75 ML/HR: 9 INJECTION, SOLUTION INTRAVENOUS at 08:56

## 2019-09-02 RX ADMIN — SODIUM CHLORIDE, PRESERVATIVE FREE 10 ML: 5 INJECTION INTRAVENOUS at 09:00

## 2019-09-02 NOTE — PLAN OF CARE
"Problem: Patient Care Overview  Goal: Plan of Care Review  Outcome: Ongoing (interventions implemented as appropriate)   09/02/19 0517   Coping/Psychosocial   Plan of Care Reviewed With patient   Plan of Care Review   Progress improving   OTHER   Outcome Summary No seizure activity during the night. Pt c/o shoulder pain and asked for pain medication. Offered him tylenol and he refused, pt stated he did not work. Pt stated \" I'm seeing a pain doctor, and I want to start back on my meds.\"  Call placed to on call doctor and he resumed his Tramadol and the patient refused the medication.       Problem: Seizure Disorder/Epilepsy (Adult)  Goal: Signs and Symptoms of Listed Potential Problems Will be Absent, Minimized or Managed (Seizure Disorder/Epilepsy)  Outcome: Ongoing (interventions implemented as appropriate)   09/02/19 0517   Goal/Outcome Evaluation   Problems Present (Seizure/Epilepsy) none         "

## 2019-09-02 NOTE — PLAN OF CARE
Problem: Patient Care Overview  Goal: Plan of Care Review  Outcome: Ongoing (interventions implemented as appropriate)   09/02/19 1329   Coping/Psychosocial   Plan of Care Reviewed With patient   Plan of Care Review   Progress improving   OTHER   Outcome Summary Patient alert and oriented. VSS. o seizre activity during the shift. Discharged hoome and family to transport. Will continue to monitor.       Problem: Seizure Disorder/Epilepsy (Adult)  Goal: Signs and Symptoms of Listed Potential Problems Will be Absent, Minimized or Managed (Seizure Disorder/Epilepsy)  Outcome: Ongoing (interventions implemented as appropriate)      Problem: Fall Risk (Adult)  Goal: Absence of Fall  Outcome: Ongoing (interventions implemented as appropriate)

## 2019-09-02 NOTE — DISCHARGE SUMMARY
Date of Admission: 8/31/2019  Date of Discharge:  9/2/2019  Primary Care Physician: Provider, No Known     Discharge Diagnosis:  Active Hospital Problems    Diagnosis  POA   • **New onset seizure due to tramadol use [R56.9]  Yes   • Mild tetrahydrocannabinol (THC) abuse [F12.10]  Unknown   • ADD (attention deficit disorder) [F98.8]  Unknown      Resolved Hospital Problems   No resolved problems to display.       DETAILS OF HOSPITAL STAY     Pertinent Test Results and Procedures Performed  Head CT showed no intra-acute intracranial abnormality    Urine drug screen positive for THC and methadone    Hospital Course  This is a 38-year-old male who presented with a seizure episode.  He has never had any seizures before.  This was a one-time event and did not recur.  He was admitted and had a head CT that was negative.  Urine drug screen was positive for THC which he endorses but also methadone which he does not.  He has been taking tramadol in the outpatient setting.  He was evaluated by neurology who felt that the tramadol was the triggering factor for his seizure.  We have recommended to the patient that he discontinue this indefinitely.  He expresses understanding and agreement.  Neurology did not recommend any additional work-up and given that he is medically stable today they have cleared him for discharge.  I discussed this with the patient and his significant other today and they are in agreement with plans for release.    Physical Exam at Discharge:  General: No acute distress, AAOx3  HEENT: EOMI, PERRL  Cardiovascular: +s1 and s2, RRR  Lungs: No rhonchi or wheezing  Abdomen: soft, nontender    Consults:   Consults     Date and Time Order Name Status Description    9/1/2019 0533 Inpatient Neurology Consult General Completed     9/1/2019 0510 [facility] (on-call MD unless specified) Details Completed             Condition on Discharge: Stable, improved    Discharge Disposition  Home or Self Care    Discharge  Medications     Discharge Medications      Continue These Medications      Instructions Start Date   acetaminophen 500 MG tablet  Commonly known as:  TYLENOL   500 mg, Oral, Every 6 Hours PRN      HYDROcodone-acetaminophen 5-325 MG per tablet  Commonly known as:  NORCO   1 tablet, Oral, Every 12 Hours      ZENZEDI 30 MG tablet  Generic drug:  Dextroamphetamine Sulfate   Zenzedi 30 mg tablet         Stop These Medications    amoxicillin 500 MG capsule  Commonly known as:  AMOXIL     diclofenac 75 MG EC tablet  Commonly known as:  VOLTAREN     traMADol 50 MG tablet  Commonly known as:  ULTRAM            Discharge Diet:   Diet Instructions     Diet: Regular; Thin      Discharge Diet:  Regular    Fluid Consistency:  Thin          Activity at Discharge:   Activity Instructions     Activity as Tolerated            Follow-up Appointments  No future appointments.  Additional Instructions for the Follow-ups that You Need to Schedule     Discharge Follow-up with PCP   As directed       Currently Documented PCP:    Provider, No Known    PCP Phone Number:    [phone]     Follow Up Details:  1-2 weeks               I have examined and discussed discharge planning with the patient today.     Rosendo Kumar MD  09/02/19  1:25 PM    Time: Discharge greater than 30 min

## 2019-09-02 NOTE — PROGRESS NOTES
Case Management Discharge Note    Final Note: Pt discharged home, no known needs. SALLIE Olvera RN    Destination      No service has been selected for the patient.      Durable Medical Equipment      No service has been selected for the patient.      Dialysis/Infusion      No service has been selected for the patient.      Home Medical Care      No service has been selected for the patient.      Therapy      No service has been selected for the patient.      Community Resources      No service has been selected for the patient.        Transportation Services  Private: Car    Final Discharge Disposition Code: 01 - home or self-care

## 2019-09-02 NOTE — NURSING NOTE
Pt requested pain medication for left shoulder pain and refused the prn pain medications offered (tylenol and tramadol.)